# Patient Record
Sex: MALE | Race: ASIAN | Employment: OTHER | URBAN - METROPOLITAN AREA
[De-identification: names, ages, dates, MRNs, and addresses within clinical notes are randomized per-mention and may not be internally consistent; named-entity substitution may affect disease eponyms.]

---

## 2023-02-15 LAB — PROSTATE SPECIFIC ANTIGEN: 7.76 NG/ML

## 2023-05-16 ENCOUNTER — TELEPHONE (OUTPATIENT)
Dept: UROLOGY | Age: 73
End: 2023-05-16

## 2023-07-19 ENCOUNTER — OFFICE VISIT (OUTPATIENT)
Dept: UROLOGY | Age: 73
End: 2023-07-19
Payer: MEDICAID

## 2023-07-19 DIAGNOSIS — R97.20 ELEVATED PSA: ICD-10-CM

## 2023-07-19 DIAGNOSIS — N40.0 BENIGN PROSTATIC HYPERPLASIA, UNSPECIFIED WHETHER LOWER URINARY TRACT SYMPTOMS PRESENT: Primary | ICD-10-CM

## 2023-07-19 LAB
BILIRUBIN, URINE, POC: NEGATIVE
BLOOD URINE, POC: NEGATIVE
GLUCOSE URINE, POC: NEGATIVE
KETONES, URINE, POC: NEGATIVE
LEUKOCYTE ESTERASE, URINE, POC: NEGATIVE
NITRITE, URINE, POC: NEGATIVE
PH, URINE, POC: 6.5 (ref 4.6–8)
PROTEIN,URINE, POC: NORMAL
PVR, POC: 62 CC
SPECIFIC GRAVITY, URINE, POC: 1.02 (ref 1–1.03)
URINALYSIS CLARITY, POC: NORMAL
URINALYSIS COLOR, POC: NORMAL
UROBILINOGEN, POC: NORMAL

## 2023-07-19 PROCEDURE — 81003 URINALYSIS AUTO W/O SCOPE: CPT | Performed by: UROLOGY

## 2023-07-19 PROCEDURE — 51798 US URINE CAPACITY MEASURE: CPT | Performed by: UROLOGY

## 2023-07-19 PROCEDURE — 99214 OFFICE O/P EST MOD 30 MIN: CPT | Performed by: UROLOGY

## 2023-07-19 PROCEDURE — 1123F ACP DISCUSS/DSCN MKR DOCD: CPT | Performed by: UROLOGY

## 2023-07-19 RX ORDER — SILODOSIN 8 MG/1
8 CAPSULE ORAL EVERY EVENING
Qty: 90 CAPSULE | Refills: 3 | Status: SHIPPED | OUTPATIENT
Start: 2023-07-19

## 2023-07-19 RX ORDER — FINASTERIDE 5 MG/1
5 TABLET, FILM COATED ORAL DAILY
Qty: 90 TABLET | Refills: 3 | Status: SHIPPED | OUTPATIENT
Start: 2023-07-19

## 2023-07-19 ASSESSMENT — ENCOUNTER SYMPTOMS: NAUSEA: 0

## 2023-07-19 NOTE — PROGRESS NOTES
[facility]  [facility]    [address]  [phone]          Miguel Verde  : 1950    Chief Complaint   Patient presents with    Follow-up          HPI     Miguel Verde is a 67 y.o. male with bothersome lower urinary tract symptoms and elevated PSA. Seen 2023 by  me and finasteride + flomax started for bothersome LUTS. Flomax caused rash and therefore he changed to rapaflo. Today, he reports doing much better on rapaflo + finasteride. Dysuria better. Stronger stream, nocturia resolved. No U/F.  Very pleased. At his prior visit, before medication, he reported a 3-4 year history of the above. Has bothersome urgency, frequency, weak stream, nocturia 5-6, incomplete emptying, split stream.  Has tried flomax 0.4 mg QHS daily but developed rash. Was taking every other day without rash but feels that it does not help his LUTS as much. PSAs per daughter started around 3 and have gone up by 1 point each year. No personal or FH of  cancer. Has never had biopsy or MRI prostate. H&P conducted with  today. PVR: 62 cc down from 242 cc     IPSS: 15    Lab Results   Component Value Date    PSA 4.8 (H) 2023    PSA 7.760 02/15/2023             Past Medical History:   Diagnosis Date    Hypertension      History reviewed. No pertinent surgical history. Current Outpatient Medications   Medication Sig Dispense Refill    silodosin (RAPAFLO) 8 MG CAPS Take 1 capsule by mouth every evening 90 capsule 3    finasteride (PROSCAR) 5 MG tablet Take 1 tablet by mouth daily 90 tablet 3    amLODIPine (NORVASC) 10 MG tablet Take 1 tablet by mouth daily      lisinopril (PRINIVIL;ZESTRIL) 10 MG tablet Take 1 tablet by mouth daily      tamsulosin (FLOMAX) 0.4 MG capsule Take 1 capsule by mouth every other day (Patient not taking: Reported on 2023)       No current facility-administered medications for this visit.      No Known Allergies  Social

## 2023-07-20 LAB
PSA FREE MFR SERPL: 27.1 %
PSA FREE SERPL-MCNC: 1.3 NG/ML
PSA SERPL-MCNC: 4.8 NG/ML

## 2023-07-20 ASSESSMENT — ENCOUNTER SYMPTOMS
BACK PAIN: 0
SHORTNESS OF BREATH: 0
COUGH: 0
DIARRHEA: 0
ABDOMINAL PAIN: 0
SKIN LESIONS: 0
WHEEZING: 0
HEARTBURN: 0
EYE PAIN: 0
BLOOD IN STOOL: 0
VOMITING: 0
EYE DISCHARGE: 0
CONSTIPATION: 0
INDIGESTION: 0

## 2023-07-20 NOTE — RESULT ENCOUNTER NOTE
Ihsan Sanchez, please let patient know that his PSA responded appropriately to finasteride. He should keep follow up with me as scheduled. Thanks!   Luis

## 2023-07-27 ENCOUNTER — TELEPHONE (OUTPATIENT)
Dept: UROLOGY | Age: 73
End: 2023-07-27